# Patient Record
(demographics unavailable — no encounter records)

---

## 2025-01-24 NOTE — REVIEW OF SYSTEMS
[Fever] : fever [Chills] : chills [Malaise] : malaise [Nasal Discharge] : nasal discharge [Nasal Congestion] : nasal congestion [Sore Throat] : sore throat [Cough] : cough [Appetite Changes] : appetite changes [Negative] : Genitourinary [FreeTextEntry1] : nausea

## 2025-01-24 NOTE — PHYSICAL EXAM
[Clear Rhinorrhea] : clear rhinorrhea [Hypertrophied Nasal Mucosa] : hypertrophied nasal mucosa [NL] : no abnormal lymph nodes palpated

## 2025-01-24 NOTE — DISCUSSION/SUMMARY
[FreeTextEntry1] : 13 year female with viral illness (flu-like symptoms) and fever. Rapid flu negative. May be too soon to pick it up as symptoms just started within the last 12-24 hours. PCR sent out to confirm. If positive dad would like to start Tamiflu, discussed the common side effects etc. For now can do OTC supportive care for symptomatic relief. Recommend supportive care. Encourage fluids and rest. Cool mist humidifier for nasal congestion and nasal saline as needed. Administer tylenol and/or motrin as needed for pain or fever. Return to office if symptoms worsen or for persistent fever above 100.4 F.

## 2025-01-24 NOTE — HISTORY OF PRESENT ILLNESS
[FreeTextEntry6] : 12 y/o patient presents today for fever, throat pain, and cough, x1 day. Patient also feels nauseous and has stomachaches, as well as headaches. Last night was experiencing cold sweats. Highest fever was 102F this morning. Was given fever reducer this morning at 10AM but dad does not remember the name. Elevated temperature in office at 99.5F. This all started last night suddenly. No vomiting or diarrhea but no appetite today so far.